# Patient Record
Sex: FEMALE | Race: WHITE | NOT HISPANIC OR LATINO | Employment: OTHER | URBAN - METROPOLITAN AREA
[De-identification: names, ages, dates, MRNs, and addresses within clinical notes are randomized per-mention and may not be internally consistent; named-entity substitution may affect disease eponyms.]

---

## 2020-06-05 ENCOUNTER — OFFICE VISIT (OUTPATIENT)
Dept: URBAN - METROPOLITAN AREA CLINIC 35 | Facility: CLINIC | Age: 50
End: 2020-06-05

## 2020-06-05 ENCOUNTER — TELEPHONE ENCOUNTER (OUTPATIENT)
Dept: URBAN - METROPOLITAN AREA CLINIC 35 | Facility: CLINIC | Age: 50
End: 2020-06-05

## 2020-06-05 ENCOUNTER — DASHBOARD ENCOUNTERS (OUTPATIENT)
Age: 50
End: 2020-06-05

## 2020-06-05 VITALS — BODY MASS INDEX: 17.58 KG/M2 | HEIGHT: 68 IN | WEIGHT: 116 LBS

## 2020-06-05 PROBLEM — 16331000 HEARTBURN: Status: ACTIVE | Noted: 2020-05-18

## 2020-06-05 PROBLEM — 249504006 FLATULENCE: Status: ACTIVE | Noted: 2020-05-18

## 2020-06-05 PROBLEM — 4556007 GASTRITIS: Status: ACTIVE | Noted: 2020-06-05

## 2020-06-05 PROBLEM — 271834000 ERUCTATION: Status: ACTIVE | Noted: 2020-05-18

## 2020-06-05 RX ORDER — RIFAXIMIN 550 MG/1
1 TABLET TABLET ORAL THREE TIMES A DAY
Qty: 30 TABLET | Refills: 0 | OUTPATIENT
Start: 2020-06-05

## 2020-06-05 RX ORDER — LICORICE ROOT 450 MG
AS DIRECTED CAPSULE ORAL
Status: ACTIVE | COMMUNITY

## 2020-06-05 RX ORDER — ACETAMINOPHEN 500 MG
AS DIRECTED TABLET ORAL
Status: ACTIVE | COMMUNITY

## 2020-06-05 RX ORDER — CHOLECALCIFEROL (VITAMIN D3) 50 MCG
1 TABLET TABLET ORAL ONCE A DAY
Qty: 30 | Status: ACTIVE | COMMUNITY

## 2020-06-05 RX ORDER — BIOTIN 10 MG
1 TABLET TABLET ORAL ONCE A DAY
Qty: 30 | Status: ACTIVE | COMMUNITY

## 2020-06-05 RX ORDER — KELP 150 MCG
1 TABLET TABLET ORAL ONCE A DAY
Qty: 30 | Status: ACTIVE | COMMUNITY

## 2020-06-05 RX ORDER — RIFAXIMIN 550 MG/1
1 TABLET TABLET ORAL THREE TIMES A DAY
Qty: 42 TABLET | Refills: 0 | OUTPATIENT
Start: 2020-06-05

## 2020-06-05 RX ORDER — B-COMPLEX WITH VITAMIN C
AS DIRECTED TABLET ORAL
Status: ACTIVE | COMMUNITY

## 2020-06-05 RX ORDER — SUCRALFATE 1 G/10ML
10 ML ON AN EMPTY STOMACH SUSPENSION ORAL TWICE A DAY
Qty: 280 ML | Refills: 2 | Status: ON HOLD | COMMUNITY
Start: 2020-05-27

## 2020-06-05 RX ORDER — CIDER VINEGAR 300 MG
AS DIRECTED TABLET ORAL
Status: ACTIVE | COMMUNITY

## 2020-06-05 RX ORDER — PANTOPRAZOLE SODIUM 40 MG/1
TK 1 T PO QD TABLET, DELAYED RELEASE ORAL
Qty: 30 UNSPECIFIED | Refills: 0 | Status: ON HOLD | COMMUNITY

## 2020-06-05 RX ORDER — MAGNESIUM OXIDE/MAG AA CHELATE 300 MG
1 CAPSULE WITH A MEAL CAPSULE ORAL ONCE A DAY
Qty: 30 | Status: ACTIVE | COMMUNITY

## 2020-06-05 RX ORDER — MULTIVIT-MIN/IRON/FOLIC ACID/K 18-600-40
AS DIRECTED CAPSULE ORAL
Status: ACTIVE | COMMUNITY

## 2020-06-05 RX ORDER — THYROID, PORCINE 120 MG/1
1 TABLET ON AN EMPTY STOMACH TABLET ORAL TWICE A DAY
Status: ACTIVE | COMMUNITY

## 2020-06-05 RX ORDER — AMOXICILLIN 500 MG
AS DIRECTED CAPSULE ORAL
Status: ACTIVE | COMMUNITY

## 2020-06-05 NOTE — HPI-MIGRATED HPI
;   ;     Bloating/Gas : Patient presents for follow up of bloating and gas . She completed SIBO test 06/03/2020 resulting negative and results noted.   She admit improvement since last visit since she has been adhering to adhering to low FODMAP diet. Only able to tolerate seaweed, yogurt, steamed cabbage and chicken bone broth     Of last visit (05/18/2020)Patient complains of bloating. Patient admits symptoms are more present after _eating certain foods. Patient admits symptoms have been present for more than a year and has gotten worse in the past  6 months . Patient had a food allergy test completed 12/2019 with results noted in chart . Patient admits removing all foods she had a reaction to and is only eating proteins in meat form . Patient describes symptoms as abdominal distention and discomfort along with frequent belching .   Patient denies having any previous breath test including H. Pylori or SIBO .  ;   Heartburn : Patient presents today via televisit with consent for a follow up of heartburn,  discuss EGD and SIBO test results. Patient had EGD completed on 05/26/2020 with results noted below . She denies any complications after her procedure.   She admit improvement since she has been adhering to low FODMAP diet.  Only able to tolerate seaweed, yogurt, steamed cabbage and chicken bone broth.  She was not able to take Gaviscon due to not able to tolerate the sorbitol artificial sweetener in it.   Of last visit (05/18/2020)  50 year old female patient presents for heartburn consult. Patient states she has been experiencing symptoms for the past 4 months consistently . Patient denies currently taking any OTC or prescribed medications for relief of symptoms. She admits trying Pantoprazole 40 mg for a month and stopped due to research and medication not working . She states she suspects she has low stomach acid so a PPI wouldn't work and would cause more harm than good .Patient describes symptoms as burning sensation and abdominal gurgling . Patient also admits to continuous belching . Patient admits every food aggravates symptoms . She admits symptoms are present throughout the day but more severe during the evening .  Patient denies nighttime symptoms. Patient denies nausea or vomiting.  Patient admits mild  associated weight loss.   Patient denies EGD in the past.;

## 2020-06-08 ENCOUNTER — OFFICE VISIT (OUTPATIENT)
Dept: URBAN - METROPOLITAN AREA CLINIC 33 | Facility: CLINIC | Age: 50
End: 2020-06-08

## 2020-06-09 ENCOUNTER — TELEPHONE ENCOUNTER (OUTPATIENT)
Dept: URBAN - METROPOLITAN AREA CLINIC 35 | Facility: CLINIC | Age: 50
End: 2020-06-09

## 2020-06-09 PROBLEM — 197125005 IRRITABLE BOWEL SYNDROME WITH DIARRHEA: Status: ACTIVE | Noted: 2020-06-05

## 2020-06-09 RX ORDER — RIFAXIMIN 550 MG/1
1 TABLET TABLET ORAL THREE TIMES A DAY
Qty: 42 TABLET | Refills: 0 | Status: ACTIVE | COMMUNITY
Start: 2020-06-05

## 2020-06-09 RX ORDER — B-COMPLEX WITH VITAMIN C
AS DIRECTED TABLET ORAL
Status: ACTIVE | COMMUNITY

## 2020-06-09 RX ORDER — RIFAXIMIN 550 MG/1
1 TABLET TABLET ORAL TID
Qty: 42 TABLET | Refills: 0 | OUTPATIENT
Start: 2020-06-09

## 2020-06-09 RX ORDER — MAGNESIUM OXIDE/MAG AA CHELATE 300 MG
1 CAPSULE WITH A MEAL CAPSULE ORAL ONCE A DAY
Qty: 30 | Status: ACTIVE | COMMUNITY

## 2020-06-09 RX ORDER — LICORICE ROOT 450 MG
AS DIRECTED CAPSULE ORAL
Status: ACTIVE | COMMUNITY

## 2020-06-09 RX ORDER — MULTIVIT-MIN/IRON/FOLIC ACID/K 18-600-40
AS DIRECTED CAPSULE ORAL
Status: ACTIVE | COMMUNITY

## 2020-06-09 RX ORDER — BIOTIN 10 MG
1 TABLET TABLET ORAL ONCE A DAY
Qty: 30 | Status: ACTIVE | COMMUNITY

## 2020-06-09 RX ORDER — THYROID, PORCINE 120 MG/1
1 TABLET ON AN EMPTY STOMACH TABLET ORAL TWICE A DAY
Status: ACTIVE | COMMUNITY

## 2020-06-09 RX ORDER — PANTOPRAZOLE SODIUM 40 MG/1
TK 1 T PO QD TABLET, DELAYED RELEASE ORAL
Qty: 30 UNSPECIFIED | Refills: 0 | Status: ON HOLD | COMMUNITY

## 2020-06-09 RX ORDER — KELP 150 MCG
1 TABLET TABLET ORAL ONCE A DAY
Qty: 30 | Status: ACTIVE | COMMUNITY

## 2020-06-09 RX ORDER — AMOXICILLIN 500 MG
AS DIRECTED CAPSULE ORAL
Status: ACTIVE | COMMUNITY

## 2020-06-09 RX ORDER — CIDER VINEGAR 300 MG
AS DIRECTED TABLET ORAL
Status: ACTIVE | COMMUNITY

## 2020-06-09 RX ORDER — RIFAXIMIN 550 MG/1
1 TABLET TABLET ORAL THREE TIMES A DAY
Qty: 30 TABLET | Refills: 0 | Status: ACTIVE | COMMUNITY
Start: 2020-06-05

## 2020-06-09 RX ORDER — ACETAMINOPHEN 500 MG
AS DIRECTED TABLET ORAL
Status: ACTIVE | COMMUNITY

## 2020-06-09 RX ORDER — CHOLECALCIFEROL (VITAMIN D3) 50 MCG
1 TABLET TABLET ORAL ONCE A DAY
Qty: 30 | Status: ACTIVE | COMMUNITY

## 2020-06-09 RX ORDER — SUCRALFATE 1 G/10ML
10 ML ON AN EMPTY STOMACH SUSPENSION ORAL TWICE A DAY
Qty: 280 ML | Refills: 2 | Status: ON HOLD | COMMUNITY
Start: 2020-05-27